# Patient Record
(demographics unavailable — no encounter records)

---

## 2025-07-29 NOTE — ASSESSMENT
[FreeTextEntry1] : New diagnosis with uncertain prognosis  The pathology was discussed with the patient at length including the use of diagrams drawn in the office.  We discussed that the recommended treatment is closed treatment with full time splinting for 6weeks.  During those 6 weeks the splint cannot come off- if the finger tip bends for even a fraction of a second the healing tissue tears and the 6 week period would start all over again.  The splint must remain clean and dry and needs to be well covered in the shower.  The patient verbalized understanding of the need for full time 24/7 splinting.  We also discussed the possibility of pin placement surgically to allow a splint to come on and off, although it must be worn except for hygiene to protect the pin and prevent bending of the pin.  We discussed that the only true indication for surgery is an incongruent joint.   We discussed that there were 4 possibilities: a perfect finger, a stiff finger, a bent finger or a dorsal bump over the tendon attachment site.  There could be combinations of stiff, bent and bumpy finger. We discussed that after 6 weeks of full time splinting I recommend 6 weeks of nighttime splinting.  The patient understands that splinting may lead to skin breakdown under the splint.  There is also a possibility of surgery in the future up to and including fusion.  The patient agrees to recommended plan of splinting.  MRI reviewed  A static finger alumafoam splint was fashioned and applied to left thumb.  The importance of ice and elevation were discussed with the patient.  The risks were also discussed such as compartment syndrome and skin breakdown.  They were instructed to never put foreign objects down the splint.  Patients should call for increasing pain, worsening swelling, numbness, extremity discoloration, or any other concerns.  RTO 6 weeks

## 2025-07-29 NOTE — HISTORY OF PRESENT ILLNESS
[de-identified] : 07/29/2025: Here today for new consult for left thumb- He was previously seen in [facility] where he underwent x-ray and MRI.  Images reviewed by me today. My independent interpretation of this test is EPL tendon avulsion with retraction of the tendon. Patient has been wearing oval-8 brace from .  Occupation:  [employer] Rush's note reviewed: 7/20/2025: 47-year-old male here today complaining of LT thumb pain after jamming it 1 week ago. he was shucking oysters and forcibly flexed the tip of his thumb. he reports pain and swelling since the incident. RT hand dominant.